# Patient Record
Sex: FEMALE | Race: WHITE | URBAN - METROPOLITAN AREA
[De-identification: names, ages, dates, MRNs, and addresses within clinical notes are randomized per-mention and may not be internally consistent; named-entity substitution may affect disease eponyms.]

---

## 2020-01-21 ENCOUNTER — OFFICE VISIT (OUTPATIENT)
Dept: URBAN - METROPOLITAN AREA CLINIC 62 | Facility: CLINIC | Age: 65
End: 2020-01-21
Payer: MEDICAID

## 2020-01-21 DIAGNOSIS — H52.4 PRESBYOPIA: Chronic | ICD-10-CM

## 2020-01-21 DIAGNOSIS — H25.813 COMBINED FORMS OF AGE-RELATED CATARACT, BILATERAL: Primary | ICD-10-CM

## 2020-01-21 DIAGNOSIS — H43.811 VITREOUS DEGENERATION, RIGHT EYE: Chronic | ICD-10-CM

## 2020-01-21 PROCEDURE — 92004 COMPRE OPH EXAM NEW PT 1/>: CPT | Performed by: OPTOMETRIST

## 2020-01-21 ASSESSMENT — INTRAOCULAR PRESSURE
OD: 16
OS: 14

## 2020-01-21 ASSESSMENT — VISUAL ACUITY
OS: 20/30
OD: 20/40

## 2020-01-21 NOTE — IMPRESSION/PLAN
Impression: Presbyopia: [id number]. Plan: Recommend appointment for prescription at this time. Declines MRx today.